# Patient Record
Sex: FEMALE | Race: WHITE | NOT HISPANIC OR LATINO | Employment: UNEMPLOYED | URBAN - METROPOLITAN AREA
[De-identification: names, ages, dates, MRNs, and addresses within clinical notes are randomized per-mention and may not be internally consistent; named-entity substitution may affect disease eponyms.]

---

## 2023-12-18 NOTE — PROGRESS NOTES
"Neurosurgical Consultation      Airam Valentino is a 28 y.o. female is being seen for consultation today at the request of FAISAL Daniel    Chief Complaint   Patient presents with    Headache     New evaluation        Previous treatment:    HPI: This is a 28-year-old woman with a history of congenital hydrocephalus.  She had a shunt placed around birth.  This was a right occipital region shunt.  It has never been touched since then.  She switched primary care providers who recommended a updated CT of her head and following up with me to \"establish care\".  She has baseline cognitive delay.  She was able to complete education through secondary school.  She does have a 4-year-old daughter.  She does not have any new or concerning neurologic symptoms.    Past Medical History:   Diagnosis Date    Hypertension         History reviewed. No pertinent surgical history.     Current Outpatient Medications on File Prior to Visit   Medication Sig Dispense Refill    amLODIPine (NORVASC) 5 MG tablet       medroxyPROGESTERone Acetate 150 MG/ML suspension prefilled syringe INEJCT 1 ML INTRAMUSCULARLY EVERY 3 MONTHS       No current facility-administered medications on file prior to visit.        No Known Allergies     Social History     Socioeconomic History    Marital status: Single   Tobacco Use    Smoking status: Never    Smokeless tobacco: Never   Vaping Use    Vaping Use: Never used   Substance and Sexual Activity    Alcohol use: Yes     Comment: Rarely    Drug use: Never    Sexual activity: Defer          Review of Systems     Physical Examination:     Vitals:    12/19/23 1535   BP: 132/100   Pulse: 91   Weight: 45.4 kg (100 lb)   Height: 149.9 cm (59\")   PainSc: 0-No pain        Physical Exam  Eyes:      General: Lids are normal.      Extraocular Movements: Extraocular movements intact.      Pupils: Pupils are equal, round, and reactive to light.   Neurological:      Cranial Nerves: Dysarthria present.      "     Neurological Exam  Mental Status  Awake, alert and oriented to person, place and time. Mild dysarthria present. Mixed aphasia present. Fund of knowledge is abnormal.    Cranial Nerves  CN II: Visual fields full to confrontation.  CN III, IV, VI: Extraocular movements intact bilaterally. Normal lids and orbits bilaterally. Pupils equal round and reactive to light bilaterally.  CN V: Facial sensation is normal.  CN VII: Full and symmetric facial movement.  CN IX, X: Palate elevates symmetrically  CN XI: Shoulder shrug strength is normal.  CN XII: Tongue midline without atrophy or fasciculations.  Slight amblyopia.    Motor  Normal muscle bulk throughout. No fasciculations present. No pronator drift.    Sensory  Light touch is normal in upper and lower extremities.     Coordination  Right: Finger-to-nose normal.Left: Finger-to-nose normal.       Result Review  The following data was reviewed by: Trevor Overton MD on 12/19/2023:    Data reviewed : Radiologic studies CT of the head shows a right occipital region ventriculoperitoneal shunt placement.  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      Assessment/plan:  This is a 28-year-old woman with congenital hydrocephalus status post ventriculoperitoneal shunting.  This was accomplished around birth.  She has had no revisions or exploration of the shunt.  She does not have any new or concerning symptoms.  I do not recommend any neurosurgical interventions.  I do recommend a shunt series to understand the anatomy of the ventricular and abdominal tubing for baseline imaging.  I do not recommend surveillance imaging in the future.  If she has signs or symptoms of shunt malfunction she can call my clinic or present to the hospital.  I have encouraged her to call with any questions or concerns.    Diagnoses and all orders for this visit:    1. Congenital hydrocephalus (Primary)  -     XR Shunt  Series; Future         Return if symptoms worsen or fail to improve.            Trevor Overton MD

## 2023-12-19 ENCOUNTER — OFFICE VISIT (OUTPATIENT)
Dept: NEUROSURGERY | Facility: CLINIC | Age: 28
End: 2023-12-19
Payer: MEDICAID

## 2023-12-19 VITALS
BODY MASS INDEX: 20.16 KG/M2 | HEIGHT: 59 IN | HEART RATE: 91 BPM | DIASTOLIC BLOOD PRESSURE: 100 MMHG | SYSTOLIC BLOOD PRESSURE: 132 MMHG | WEIGHT: 100 LBS

## 2023-12-19 DIAGNOSIS — Q03.9 CONGENITAL HYDROCEPHALUS: Primary | ICD-10-CM

## 2023-12-19 RX ORDER — MEDROXYPROGESTERONE ACETATE 150 MG/ML
INJECTION, SUSPENSION INTRAMUSCULAR
COMMUNITY
Start: 2023-10-21

## 2023-12-19 RX ORDER — AMLODIPINE BESYLATE 5 MG/1
TABLET ORAL
COMMUNITY
Start: 2023-12-18

## 2024-01-02 ENCOUNTER — TELEPHONE (OUTPATIENT)
Dept: NEUROSURGERY | Facility: CLINIC | Age: 29
End: 2024-01-02
Payer: MEDICAID

## 2024-01-02 NOTE — TELEPHONE ENCOUNTER
Caller: PATRICIA GONZALEZ    Relationship: Emergency Contact    Best call back number: [phone]      What specialty or service is being requested: XRAY    What is the provider, practice or medical service name: [facility]     What is the office location:  [facility]      What is the office phone number: [phone]    Any additional details: PLEASE SEND XRAY ORDER    THANK YOU,

## 2024-03-25 ENCOUNTER — TELEPHONE (OUTPATIENT)
Dept: NEUROSURGERY | Facility: CLINIC | Age: 29
End: 2024-03-25
Payer: MEDICAID
